# Patient Record
Sex: FEMALE
[De-identification: names, ages, dates, MRNs, and addresses within clinical notes are randomized per-mention and may not be internally consistent; named-entity substitution may affect disease eponyms.]

---

## 2017-04-27 ENCOUNTER — CHARTING TRANS (OUTPATIENT)
Dept: OTHER | Age: 67
End: 2017-04-27

## 2025-04-02 ENCOUNTER — HOSPITAL ENCOUNTER (OUTPATIENT)
Age: 75
Discharge: HOME | End: 2025-04-02
Payer: MEDICARE

## 2025-04-02 VITALS
HEART RATE: 90 BPM | RESPIRATION RATE: 20 BRPM | OXYGEN SATURATION: 99 % | SYSTOLIC BLOOD PRESSURE: 150 MMHG | TEMPERATURE: 99 F | DIASTOLIC BLOOD PRESSURE: 80 MMHG

## 2025-04-02 DIAGNOSIS — S01.81XA CHIN LACERATION, INITIAL ENCOUNTER: Primary | ICD-10-CM

## 2025-04-02 PROCEDURE — 12011 RPR F/E/E/N/L/M 2.5 CM/<: CPT | Performed by: NURSE PRACTITIONER

## 2025-04-02 PROCEDURE — 99203 OFFICE O/P NEW LOW 30 MIN: CPT | Performed by: NURSE PRACTITIONER

## 2025-04-02 RX ORDER — LEVOTHYROXINE SODIUM 25 UG/1
75 TABLET ORAL
COMMUNITY

## 2025-04-02 RX ORDER — LIDOCAINE HYDROCHLORIDE 10 MG/ML
5 INJECTION, SOLUTION EPIDURAL; INFILTRATION; INTRACAUDAL; PERINEURAL ONCE
Status: COMPLETED | OUTPATIENT
Start: 2025-04-02 | End: 2025-04-02

## 2025-04-02 RX ORDER — ESOMEPRAZOLE MAGNESIUM 40 MG/1
40 CAPSULE, DELAYED RELEASE ORAL DAILY
COMMUNITY

## 2025-04-02 RX ORDER — HYDROXYZINE HYDROCHLORIDE 50 MG/1
50 TABLET, FILM COATED ORAL EVERY 8 HOURS PRN
COMMUNITY
Start: 2024-02-19

## 2025-04-02 RX ORDER — TACROLIMUS 0.5 MG/1
1 CAPSULE ORAL EVERY 12 HOURS
COMMUNITY

## 2025-04-02 RX ORDER — MAGNESIUM OXIDE 400 MG/1
400 TABLET ORAL DAILY
COMMUNITY

## 2025-04-02 RX ORDER — VILAZODONE HYDROCHLORIDE 20 MG/1
1 TABLET ORAL DAILY
COMMUNITY
Start: 2022-09-15

## 2025-04-02 RX ORDER — CETIRIZINE HYDROCHLORIDE 10 MG/1
1 TABLET ORAL DAILY
COMMUNITY
Start: 2024-10-24

## 2025-04-02 RX ORDER — NYSTATIN 100000 [USP'U]/ML
500000 SUSPENSION ORAL DAILY
COMMUNITY

## 2025-04-02 RX ORDER — HYDROXYZINE HYDROCHLORIDE 10 MG/1
10 TABLET, FILM COATED ORAL
COMMUNITY
Start: 2024-10-17

## 2025-04-02 RX ORDER — LEVOTHYROXINE SODIUM 50 UG/1
50 TABLET ORAL DAILY
COMMUNITY

## 2025-04-02 RX ORDER — TRAMADOL HYDROCHLORIDE 50 MG/1
50 TABLET ORAL
COMMUNITY
Start: 2024-11-01

## 2025-04-02 RX ORDER — VENLAFAXINE HYDROCHLORIDE 75 MG/1
225 TABLET, EXTENDED RELEASE ORAL DAILY
COMMUNITY

## 2025-04-02 RX ORDER — CLOBETASOL PROPIONATE 0.5 MG/G
OINTMENT TOPICAL
COMMUNITY
Start: 2025-02-18

## 2025-04-02 RX ORDER — MYCOPHENOLATE MOFETIL 500 MG/1
1000 TABLET ORAL EVERY 12 HOURS
COMMUNITY
Start: 2025-02-24

## 2025-04-02 RX ORDER — FLUTICASONE PROPIONATE 50 MCG
2 SPRAY, SUSPENSION (ML) NASAL DAILY
COMMUNITY
Start: 2024-10-24

## 2025-04-02 RX ORDER — CEVIMELINE HYDROCHLORIDE 30 MG/1
1 CAPSULE ORAL 3 TIMES DAILY
COMMUNITY
Start: 2024-10-29

## 2025-04-02 RX ORDER — CYCLOBENZAPRINE HCL 5 MG
TABLET ORAL 2 TIMES DAILY PRN
COMMUNITY
Start: 2025-03-24

## 2025-04-02 RX ORDER — MIRABEGRON 25 MG/1
25 TABLET, FILM COATED, EXTENDED RELEASE ORAL DAILY
COMMUNITY
Start: 2025-02-18

## 2025-04-02 RX ORDER — LEVOTHYROXINE SODIUM 75 UG/1
75 TABLET ORAL DAILY
COMMUNITY
Start: 2024-10-24

## 2025-04-02 RX ORDER — PILOCARPINE HYDROCHLORIDE 5 MG/1
5 TABLET, FILM COATED ORAL 3 TIMES DAILY
COMMUNITY

## 2025-04-02 RX ORDER — OMEPRAZOLE 40 MG/1
1 CAPSULE, DELAYED RELEASE ORAL 2 TIMES DAILY
COMMUNITY
Start: 2024-10-24

## 2025-04-02 RX ORDER — MODAFINIL 200 MG/1
200 TABLET ORAL DAILY
COMMUNITY

## 2025-04-02 RX ORDER — TACROLIMUS 0.5 MG/1
1 CAPSULE ORAL EVERY 12 HOURS
COMMUNITY
Start: 2024-10-23

## 2025-04-02 RX ORDER — DEXTROAMPHETAMINE SACCHARATE, AMPHETAMINE ASPARTATE, DEXTROAMPHETAMINE SULFATE AND AMPHETAMINE SULFATE 2.5; 2.5; 2.5; 2.5 MG/1; MG/1; MG/1; MG/1
TABLET ORAL EVERY MORNING
COMMUNITY

## 2025-04-02 RX ORDER — EZETIMIBE 10 MG/1
10 TABLET ORAL DAILY
COMMUNITY
Start: 2025-02-18

## 2025-04-02 RX ORDER — NYSTATIN 100000 [USP'U]/ML
5 SUSPENSION ORAL 4 TIMES DAILY
COMMUNITY
Start: 2024-10-23

## 2025-04-02 NOTE — ED PROVIDER NOTES
Patient Seen in: [facility]      History     Chief Complaint   Patient presents with    Laceration     Stated Complaint: Chin Laceration    Subjective:   74 y/o female with medical conditions as noted below presents with c/o laceration to chin after tripping on uneven concrete and falling hitting her chin on the ground.  No LOC, jaw pain, dizziness, tinnitus, visual changes, nausea/vomiting, headache.  Patient not on blood thinners.  Bleeding controlled              Objective:     Past Medical History:    Autoimmune disorder (HCC)    Esophageal reflux    Hyperlipidemia    Thyroid disease              History reviewed. No pertinent surgical history.             No pertinent social history.            Review of Systems   Constitutional:  Negative for chills and fever.   HENT:  Negative for facial swelling and tinnitus.    Eyes:  Negative for photophobia and visual disturbance.   Musculoskeletal:  Negative for back pain and neck pain.   Skin:  Positive for wound.   Neurological:  Negative for dizziness, light-headedness and headaches.   All other systems reviewed and are negative.      Positive for stated complaint: Chin Laceration  Other systems are as noted in HPI.  Constitutional and vital signs reviewed.      All other systems reviewed and negative except as noted above.    Physical Exam     ED Triage Vitals [04/02/25 1636]   /80   Pulse 90   Resp 20   Temp 98.6 °F (37 °C)   Temp src Oral   SpO2 99 %   O2 Device None (Room air)       Current Vitals:   Vital Signs  BP: 150/80  Pulse: 90  Resp: 20  Temp: 98.6 °F (37 °C)  Temp src: Oral    Oxygen Therapy  SpO2: 99 %  O2 Device: None (Room air)        Physical Exam  Vitals and nursing note reviewed.   Constitutional:       General: She is not in acute distress.     Appearance: Normal appearance.   HENT:      Head: Normocephalic. Laceration present.      Jaw: There is normal jaw occlusion. No swelling or malocclusion.        Right Ear: Tympanic  membrane and external ear normal.      Left Ear: Tympanic membrane and external ear normal.      Nose: Nose normal.   Eyes:      General: Vision grossly intact.      Extraocular Movements: Extraocular movements intact.      Pupils: Pupils are equal, round, and reactive to light.   Cardiovascular:      Rate and Rhythm: Normal rate and regular rhythm.   Pulmonary:      Effort: Pulmonary effort is normal.      Breath sounds: Normal breath sounds.   Musculoskeletal:         General: Normal range of motion.      Cervical back: Neck supple. No spinous process tenderness or muscular tenderness.   Skin:     General: Skin is warm and dry.      Capillary Refill: Capillary refill takes less than 2 seconds.   Neurological:      General: No focal deficit present.      Mental Status: She is alert and oriented to person, place, and time.   Psychiatric:         Behavior: Behavior is cooperative.             ED Course   Labs Reviewed - No data to display  Laceration repair:  Prior to closure, patient was provided with full details for closure and plan of care. Advantages/ disadvantages of all were explained. Patient made informed decision to proceed with suturing.      Prep:    Local anesthesia was obtained by lidocaine 1% injection  Skin irrigated with normal saline  Wound draped in sterile fashion, sterile field maintained throughout  Wound inspected in bloodless field: no foreign body visible    Closure:              Wound edges were well approximated with #3, 5-0 ethilon suture, no debridement or wound edge revision necessary.      Post closure: polysporin and band aid applied              MDM              Medical Decision Making  Patient is well-appearing. In NAD  I discussed differentials with patient including but not limited to abrasion vs laceration  Discussed with patient increased risk of intracranial hemorrhage after fall due to age.  Patient declined head CT at this time.  Patient neurologically intact.  Head injury  aftercare and precautions discussed with patient.  Verbalized understanding  Irrigated with normal saline  Sutures applied  Keep area covered for 24 hours  After removing dressing clean area with mild soap and water.  Pat dry before applying Polysporin or dressing  Watch for signs of infection  Sutures out in 5 to 7 days  Otc tylenol prn  Fu with PCP. Strict ED precautions discussed      Problems Addressed:  Chin laceration, initial encounter: acute illness or injury        Disposition and Plan     Clinical Impression:  1. Chin laceration, initial encounter         Disposition:  Discharge  4/2/2025  5:15 pm    Follow-up:  Johana Harding  [address]  M/C 2007  [facility] [id number]  [phone]                Medications Prescribed:  Discharge Medication List as of 4/2/2025  5:17 PM              Supplementary Documentation:

## 2025-04-02 NOTE — ED INITIAL ASSESSMENT (HPI)
Pt came in due to laceration on chin. Pt stated she tripped and fell in a parking lot and hit her chin on the concrete. Pt denies any LOC. Pt has laceration on chin with no active bleeding. Pt has easy non labored respirations.

## 2025-04-08 ENCOUNTER — HOSPITAL ENCOUNTER (OUTPATIENT)
Age: 75
Discharge: HOME | End: 2025-04-08
Payer: MEDICARE

## 2025-04-08 VITALS
RESPIRATION RATE: 18 BRPM | TEMPERATURE: 98 F | DIASTOLIC BLOOD PRESSURE: 58 MMHG | SYSTOLIC BLOOD PRESSURE: 142 MMHG | HEART RATE: 90 BPM | OXYGEN SATURATION: 100 %

## 2025-04-08 DIAGNOSIS — Z48.02 ENCOUNTER FOR REMOVAL OF SUTURES: Primary | ICD-10-CM

## 2025-04-08 PROCEDURE — 99024 POSTOP FOLLOW-UP VISIT: CPT | Performed by: NURSE PRACTITIONER

## 2025-04-08 NOTE — ED PROVIDER NOTES
Patient Seen in: [facility]      History     Chief Complaint   Patient presents with    Suture Removal     Stated Complaint: stitch removal    Subjective:   Well-appearing 75-year-old female with attention deficit hyperactivity disorder, bilateral cataract, esophageal reflux, fatigue, hepatic cirrhosis, and hypothyroidism presents for suture removal.  Patient denies wound drainage.  Patient denies fever or chills.              Objective:     Past Medical History:    Autoimmune disorder (HCC)    Esophageal reflux    Hyperlipidemia    Thyroid disease              History reviewed. No pertinent surgical history.             No pertinent social history.            Review of Systems    Positive for stated complaint: stitch removal  Other systems are as noted in HPI.  Constitutional and vital signs reviewed.      All other systems reviewed and negative except as noted above.    Physical Exam     ED Triage Vitals [04/08/25 1142]   /58   Pulse 90   Resp 18   Temp 98.3 °F (36.8 °C)   Temp src Oral   SpO2 100 %   O2 Device None (Room air)       Current Vitals:   Vital Signs  BP: 142/58  Pulse: 90  Resp: 18  Temp: 98.3 °F (36.8 °C)  Temp src: Oral    Oxygen Therapy  SpO2: 100 %  O2 Device: None (Room air)        Physical Exam  VS: Vital signs reviewed. 02 saturation within normal limits for this patient.    General: Patient is awake and alert, oriented to person, place and time. Pt appears non-toxic.     HEENT: Head is normocephalic, atraumatic. Nonicteric sclera, no conjunctival injection. No facial droop or slurred speech. No oral lesions or pallor. Mucous membranes moist.     Neck: Supple. Normal ROM.    Lungs: Good inspiratory effort.  No accessory muscle use or tachypnea.    Extremities: No focal swelling or tenderness. Capillary refill noted.     Skin: Warm, dry and normal in color.   Healing laceration present to chin, 3 intact sutures present.  No surrounding erythema or warmth.  No red streaking.   No wound drainage.    Psychiatric: Normal affect, judgement normal, insight normal.     CNS: Moves all 4 extremities. Interacts appropriately. No gait abnormality. Memory normal.        ED Course   Labs Reviewed - No data to display     MDM   Medical Decision Making  Well appearing.   3 sutures removed without complications.  Differential diagnosis considered included  wound check versus suture removal.  Continue over-the-counter antibiotic ointment until scab falls off.  PMD follow-up.  Return precautions discussed.    Problems Addressed:  Encounter for removal of sutures: acute illness or injury    Amount and/or Complexity of Data Reviewed  External Data Reviewed: notes.     Details: April 2, 2025    Risk  OTC drugs.        Disposition and Plan     Clinical Impression:  1. Encounter for removal of sutures         Disposition:  Discharge  4/8/2025 12:09 pm    Follow-up:  Johana Harding  [address]  M/C [address] [place] [id number]  [phone]      As needed          Medications Prescribed:  Discharge Medication List as of 4/8/2025 12:10 PM              Supplementary Documentation:

## 2025-04-08 NOTE — ED INITIAL ASSESSMENT (HPI)
Pt came in for suture removal from chin. Pt denies any complications. Pt has easy non labored respirations.